# Patient Record
Sex: FEMALE | Race: WHITE | NOT HISPANIC OR LATINO | Employment: FULL TIME | URBAN - METROPOLITAN AREA
[De-identification: names, ages, dates, MRNs, and addresses within clinical notes are randomized per-mention and may not be internally consistent; named-entity substitution may affect disease eponyms.]

---

## 2021-01-02 ENCOUNTER — OFFICE VISIT (OUTPATIENT)
Dept: URGENT CARE | Facility: CLINIC | Age: 21
End: 2021-01-02
Payer: COMMERCIAL

## 2021-01-02 VITALS
OXYGEN SATURATION: 99 % | SYSTOLIC BLOOD PRESSURE: 120 MMHG | RESPIRATION RATE: 18 BRPM | BODY MASS INDEX: 45.16 KG/M2 | TEMPERATURE: 98.2 F | HEART RATE: 85 BPM | HEIGHT: 60 IN | WEIGHT: 230 LBS | DIASTOLIC BLOOD PRESSURE: 66 MMHG

## 2021-01-02 DIAGNOSIS — S51.852A CAT BITE OF LEFT FOREARM WITH INFECTION, INITIAL ENCOUNTER: Primary | ICD-10-CM

## 2021-01-02 DIAGNOSIS — L08.9 CAT BITE OF LEFT FOREARM WITH INFECTION, INITIAL ENCOUNTER: Primary | ICD-10-CM
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
## 2021-01-02 PROCEDURE — 90471 IMMUNIZATION ADMIN: CPT | Performed by: NURSE PRACTITIONER

## 2021-01-02 PROCEDURE — G0382 LEV 3 HOSP TYPE B ED VISIT: HCPCS | Performed by: NURSE PRACTITIONER

## 2021-01-02 PROCEDURE — 90715 TDAP VACCINE 7 YRS/> IM: CPT

## 2021-01-02 RX ORDER — CITALOPRAM 10 MG/1
10 TABLET ORAL DAILY
COMMUNITY
Start: 2020-11-11

## 2021-01-02 RX ORDER — DROSPIRENONE AND ETHINYL ESTRADIOL 0.02-3(28)
1 KIT ORAL DAILY
COMMUNITY

## 2021-01-02 RX ORDER — NITROFURANTOIN 25; 75 MG/1; MG/1
CAPSULE ORAL
COMMUNITY
Start: 2020-10-14

## 2021-01-02 RX ORDER — CEPHALEXIN 500 MG/1
500 CAPSULE ORAL 4 TIMES DAILY
Qty: 40 CAPSULE | Refills: 0 | Status: SHIPPED | OUTPATIENT
Start: 2021-01-02 | End: 2021-01-12

## 2021-01-02 NOTE — PATIENT INSTRUCTIONS
Take the keflex as ordered until completed  Eat yogurt or take a probiotic to restore good bacteria to your gut; this helps prevent stomach irritation/diarrhea while on an antibiotic  The antibiotic takes 24 hours to reach therapeutic level in your system  After that, the redness should not get worse; if it is, you should be seen again     It may take a few days to look significantly better  Animal Bite   AMBULATORY CARE:   Animal bite  injuries range from shallow cuts to deep, life-threatening wounds  Animal bites occur more often on the hands, arms, legs, and face  Bites from dogs and cats are the most common injuries  Common symptoms include the following:   · Cut or punctured skin     · Skin torn from your body    · Swollen or bruised skin, even if the skin is not broken    Seek care immediately if:   · You have a fever  · Your wound is red, swollen, and draining pus  · You see red streaks on the skin around the wound  · You can no longer move the bitten area  · Your heartbeat and breathing are much faster than usual     · You feel dizzy and confused  Contact your healthcare provider if:   · Your pain does not get better, even after you take pain medicine  · You have nightmares or flashbacks about the animal bite  · You have questions or concerns about your condition or care  Treatment for an animal bite  may include any of the following:  · Irrigation and debridement  may be needed to clean out your wound  Dead, damaged, or infected tissue may be cut away to help your wound heal     · Medicines:      ? Antibiotics  prevent or treat a bacterial infection  ? Prescription pain medicine  may be given  Ask how to take this medicine safely  ? A tetanus vaccine  may be needed to prevent tetanus  Tetanus is a life-threatening bacterial infection that affects the nerves and muscles  The bacteria can be spread through animal bites       ? A rabies vaccine  may be needed to prevent rabies  Rabies is a life-threatening viral infection  The virus can be spread through animal bites  · Stitches  may be needed if your wound is large and not infected  · Surgery  may be needed to repair deep injuries or severe wounds  Manage your symptoms:   · Apply antibiotic ointment as directed  This helps prevent infection in minor skin wounds  Antibiotic ointment is available without a prescription  · Keep the wound clean and covered  Wash the wound every day with soap and water or germ-killing cleanser  Ask what kinds of bandages to use  · Apply ice to your wound  Ice helps prevent tissue damage and decreases swelling and pain  Use an ice pack, or put crushed ice in a plastic bag  Cover it with a towel  Apply ice on your wound for 15 to 20 minutes every hour or as directed  · Elevate your wound  Raise your wound above the level of your heart as often as you can  This will help decrease swelling and pain  Prop your wound on pillows or blankets to keep it elevated comfortably  Prevent another animal bite:   · Learn to recognize the signs of a scared pet  Avoid quick, sudden movements  · Do not step between animals that are fighting  · Do not leave a pet alone with a young child  · Do not disturb an animal while it eats, sleeps, or cares for its young  · Do not approach an animal you do not know, especially one that is tied up or caged  · Stay away from animals that seem sick or act strangely  · Do not feed or capture wild animals  Follow up with your healthcare provider as directed:  Write down your questions so you remember to ask them during your visits  © Copyright [address] Information is for End User's use only and may not be sold, redistributed or otherwise used for commercial purposes   All illustrations and images included in CareNotes® are the copyrighted property of [employer]  or [address] [place], [place] [address]emily   The above information is an educational aid only  It is not intended as medical advice for individual conditions or treatments  Talk to your doctor, nurse or pharmacist before following any medical regimen to see if it is safe and effective for you

## 2021-01-02 NOTE — PROGRESS NOTES
[facility]        NAME: Roverto Guerra is a 23 y o  female  : 2000    MRN: [id number]  DATE: 2021  TIME: 12:08 PM    Assessment and Plan   Cat bite of left forearm with infection, initial encounter [S51 852A, L08 9, W55 01XA]  1  Cat bite of left forearm with infection, initial encounter  TDAP Vaccine greater than or equal to 8yo    cephalexin (KEFLEX) 500 mg capsule         Patient Instructions     Patient Instructions   Take the keflex as ordered until completed  Eat yogurt or take a probiotic to restore good bacteria to your gut; this helps prevent stomach irritation/diarrhea while on an antibiotic  The antibiotic takes 24 hours to reach therapeutic level in your system  After that, the redness should not get worse; if it is, you should be seen again     It may take a few days to look significantly better  Animal Bite   AMBULATORY CARE:   Animal bite  injuries range from shallow cuts to deep, life-threatening wounds  Animal bites occur more often on the hands, arms, legs, and face  Bites from dogs and cats are the most common injuries  Common symptoms include the following:   · Cut or punctured skin     · Skin torn from your body    · Swollen or bruised skin, even if the skin is not broken    Seek care immediately if:   · You have a fever  · Your wound is red, swollen, and draining pus  · You see red streaks on the skin around the wound  · You can no longer move the bitten area  · Your heartbeat and breathing are much faster than usual     · You feel dizzy and confused  Contact your healthcare provider if:   · Your pain does not get better, even after you take pain medicine  · You have nightmares or flashbacks about the animal bite  · You have questions or concerns about your condition or care  Treatment for an animal bite  may include any of the following:  · Irrigation and debridement  may be needed to clean out your wound   Dead, damaged, or infected tissue may be cut away to help your wound heal     · Medicines:      ? Antibiotics  prevent or treat a bacterial infection  ? Prescription pain medicine  may be given  Ask how to take this medicine safely  ? A tetanus vaccine  may be needed to prevent tetanus  Tetanus is a life-threatening bacterial infection that affects the nerves and muscles  The bacteria can be spread through animal bites  ? A rabies vaccine  may be needed to prevent rabies  Rabies is a life-threatening viral infection  The virus can be spread through animal bites  · Stitches  may be needed if your wound is large and not infected  · Surgery  may be needed to repair deep injuries or severe wounds  Manage your symptoms:   · Apply antibiotic ointment as directed  This helps prevent infection in minor skin wounds  Antibiotic ointment is available without a prescription  · Keep the wound clean and covered  Wash the wound every day with soap and water or germ-killing cleanser  Ask what kinds of bandages to use  · Apply ice to your wound  Ice helps prevent tissue damage and decreases swelling and pain  Use an ice pack, or put crushed ice in a plastic bag  Cover it with a towel  Apply ice on your wound for 15 to 20 minutes every hour or as directed  · Elevate your wound  Raise your wound above the level of your heart as often as you can  This will help decrease swelling and pain  Prop your wound on pillows or blankets to keep it elevated comfortably  Prevent another animal bite:   · Learn to recognize the signs of a scared pet  Avoid quick, sudden movements  · Do not step between animals that are fighting  · Do not leave a pet alone with a young child  · Do not disturb an animal while it eats, sleeps, or cares for its young  · Do not approach an animal you do not know, especially one that is tied up or caged  · Stay away from animals that seem sick or act strangely      · Do not feed or capture wild animals  Follow up with your healthcare provider as directed:  Write down your questions so you remember to ask them during your visits  © Copyright [address] Information is for End User's use only and may not be sold, redistributed or otherwise used for commercial purposes  All illustrations and images included in CareNotes® are the copyrighted property of A D A M [employer] Inc  or [facility]   The above information is an  only  It is not intended as medical advice for individual conditions or treatments  Talk to your doctor, nurse or pharmacist before following any medical regimen to see if it is safe and effective for you  Follow up with PCP in 3-5 days  Proceed to  ER if symptoms worsen  Chief Complaint     Chief Complaint   Patient presents with   [address] [facility]     left arm x 1 day         History of Present Illness       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  She states that all the animals have had their rabies vaccinations, but she is uncertain the date of this cat's  The sites are all scabbed, but the surrounding skin is now reddened and slightly warm  She does not remember the date of her last tetanus shot  Review of Systems   Review of Systems   Skin: Positive for color change and wound  All other systems reviewed and are negative          Current Medications       Current Outpatient Medications:     citalopram (CeleXA) 10 mg tablet, Take 10 mg by mouth daily, Disp: , Rfl:     drospirenone-ethinyl estradiol (JESSICA) 3-0 02 MG per tablet, Take 1 tablet by mouth daily, Disp: , Rfl:     cephalexin (KEFLEX) 500 mg capsule, Take 1 capsule (500 mg total) by mouth 4 (four) times a day for 10 days, Disp: 40 capsule, Rfl: 0    nitrofurantoin (MACROBID) 100 mg capsule, take 1 capsule by mouth twice a day for 7 days, Disp: , Rfl:     Current Allergies     Allergies as of 01/02/2021    (No Known Allergies)            The following portions of the patient's history were reviewed and updated as appropriate: allergies, current medications, past family history, past medical history, past social history, past surgical history and problem list      Past Medical History:   Diagnosis Date    Anxiety     Depression        History reviewed  No pertinent surgical history  History reviewed  No pertinent family history  Medications have been verified  Objective   /66   Pulse 85   Temp 98 2 °F (36 8 °C)   Resp 18   Ht 5' (1 524 m)   Wt 104 kg (230 lb)   SpO2 99%   BMI 44 92 kg/m²        Physical Exam     Physical Exam  Vitals signs and nursing note reviewed  Constitutional:       General: She is not in acute distress  Appearance: Normal appearance  She is well-developed  She is not ill-appearing, toxic-appearing or diaphoretic  HENT:      Head: Normocephalic and atraumatic  Eyes:      Pupils: Pupils are equal, round, and reactive to light  Neck:      Musculoskeletal: Normal range of motion and neck supple  Pulmonary:      Effort: Pulmonary effort is normal  No respiratory distress  Abdominal:      General: There is no distension  Palpations: Abdomen is soft  Musculoskeletal: Normal range of motion  Skin:     General: Skin is warm and dry  Capillary Refill: Capillary refill takes less than 2 seconds  Findings: Erythema and laceration present  Neurological:      Mental Status: She is alert and oriented to person, place, and time  Psychiatric:         Behavior: Behavior normal          Thought Content:  Thought content normal          Judgment: Judgment normal

## 2022-01-13 ENCOUNTER — OFFICE VISIT (OUTPATIENT)
Dept: PHYSICAL THERAPY | Facility: CLINIC | Age: 22
End: 2022-01-13
Payer: COMMERCIAL

## 2022-01-13 DIAGNOSIS — R20.0 NUMBNESS AND TINGLING SENSATION OF SKIN: Primary | ICD-10-CM

## 2022-01-13 DIAGNOSIS — R20.2 NUMBNESS AND TINGLING SENSATION OF SKIN: Primary | ICD-10-CM

## 2022-01-13 PROCEDURE — 97760 ORTHOTIC MGMT&TRAING 1ST ENC: CPT

## 2022-01-13 NOTE — PROGRESS NOTES
Pt arrived to the clinic with a prescription for a R wrist splint/brace  Pt was fitted for a cock-up brace for her R wrist numbness  Pt demonstrated don/doff of R wrist brace and a good understanding of proper fitment

## 2022-03-31 ENCOUNTER — HOSPITAL ENCOUNTER (OUTPATIENT)
Dept: PERIOP | Facility: HOSPITAL | Age: 22
Setting detail: OUTPATIENT SURGERY
Discharge: HOME | End: 2022-03-31
Attending: INTERNAL MEDICINE
Payer: COMMERCIAL

## 2022-03-31 ENCOUNTER — ANESTHESIA EVENT (OUTPATIENT)
Dept: PERIOP | Facility: HOSPITAL | Age: 22
End: 2022-03-31

## 2022-03-31 ENCOUNTER — ANESTHESIA (OUTPATIENT)
Dept: PERIOP | Facility: HOSPITAL | Age: 22
End: 2022-03-31

## 2022-03-31 VITALS
OXYGEN SATURATION: 99 % | HEART RATE: 77 BPM | RESPIRATION RATE: 16 BRPM | HEIGHT: 60 IN | TEMPERATURE: 98.7 F | BODY MASS INDEX: 46.72 KG/M2 | DIASTOLIC BLOOD PRESSURE: 64 MMHG | SYSTOLIC BLOOD PRESSURE: 113 MMHG | WEIGHT: 238 LBS

## 2022-03-31 DIAGNOSIS — Z80.0 FAMILY HISTORY OF MALIGNANT NEOPLASM OF DIGESTIVE ORGANS: ICD-10-CM

## 2022-03-31 DIAGNOSIS — R15.2 FECAL URGENCY: ICD-10-CM

## 2022-03-31 DIAGNOSIS — R19.7 DIARRHEA, UNSPECIFIED: ICD-10-CM

## 2022-03-31 LAB
EXT PREGNANCY TEST URINE: NEGATIVE
EXT. CONTROL: NORMAL

## 2022-03-31 PROCEDURE — 81025 URINE PREGNANCY TEST: CPT | Performed by: ANESTHESIOLOGY

## 2022-03-31 PROCEDURE — 88305 TISSUE EXAM BY PATHOLOGIST: CPT | Performed by: PATHOLOGY

## 2022-03-31 RX ORDER — LIDOCAINE HYDROCHLORIDE 10 MG/ML
INJECTION, SOLUTION EPIDURAL; INFILTRATION; INTRACAUDAL; PERINEURAL AS NEEDED
Status: DISCONTINUED | OUTPATIENT
Start: 2022-03-31 | End: 2022-03-31

## 2022-03-31 RX ORDER — SODIUM CHLORIDE, SODIUM LACTATE, POTASSIUM CHLORIDE, CALCIUM CHLORIDE 600; 310; 30; 20 MG/100ML; MG/100ML; MG/100ML; MG/100ML
125 INJECTION, SOLUTION INTRAVENOUS CONTINUOUS
Status: CANCELLED | OUTPATIENT
Start: 2022-03-31

## 2022-03-31 RX ORDER — SODIUM CHLORIDE, SODIUM LACTATE, POTASSIUM CHLORIDE, CALCIUM CHLORIDE 600; 310; 30; 20 MG/100ML; MG/100ML; MG/100ML; MG/100ML
125 INJECTION, SOLUTION INTRAVENOUS CONTINUOUS
Status: DISCONTINUED | OUTPATIENT
Start: 2022-03-31 | End: 2022-04-04 | Stop reason: HOSPADM

## 2022-03-31 RX ORDER — PROPOFOL 10 MG/ML
INJECTION, EMULSION INTRAVENOUS AS NEEDED
Status: DISCONTINUED | OUTPATIENT
Start: 2022-03-31 | End: 2022-03-31

## 2022-03-31 RX ADMIN — PROPOFOL 150 MG: 10 INJECTION, EMULSION INTRAVENOUS at 12:31

## 2022-03-31 RX ADMIN — PROPOFOL 50 MG: 10 INJECTION, EMULSION INTRAVENOUS at 12:40

## 2022-03-31 RX ADMIN — LIDOCAINE HYDROCHLORIDE 5 ML: 10 INJECTION, SOLUTION EPIDURAL; INFILTRATION; INTRACAUDAL; PERINEURAL at 12:31

## 2022-03-31 RX ADMIN — SODIUM CHLORIDE, SODIUM LACTATE, POTASSIUM CHLORIDE, AND CALCIUM CHLORIDE 125 ML/HR: .6; .31; .03; .02 INJECTION, SOLUTION INTRAVENOUS at 11:47

## 2022-03-31 RX ADMIN — PROPOFOL 50 MG: 10 INJECTION, EMULSION INTRAVENOUS at 12:39

## 2022-03-31 RX ADMIN — PROPOFOL 50 MG: 10 INJECTION, EMULSION INTRAVENOUS at 12:36

## 2022-03-31 NOTE — ANESTHESIA POSTPROCEDURE EVALUATION
Post-Op Assessment Note    CV Status:  Stable    Pain management: adequate     Mental Status:  Alert and awake   Hydration Status:  Euvolemic   PONV Controlled:  Controlled   Airway Patency:  Patent      Post Op Vitals Reviewed: Yes      Staff: CRNA         No complications documented      BP   90/52   Temp   98 7   Pulse  88   Resp   24   SpO2   97

## 2022-03-31 NOTE — H&P
History and Physical -  [facility]  Delgado Del Angel 24 y o  female MRN: [id number]                  HPI: Delgado Del Angel is a 24y o  year old female who presents for colonoscopy      REVIEW OF SYSTEMS: Per the HPI, and otherwise unremarkable  Historical Information   Past Medical History:   Diagnosis Date    Anxiety     Depression      Past Surgical History:   Procedure Laterality Date    WISDOM TOOTH EXTRACTION  2018     Social History   Social History     Substance and Sexual Activity   Alcohol Use None    Comment: socially     Social History     Substance and Sexual Activity   Drug Use Never     Social History     Tobacco Use   Smoking Status Never Smoker   Smokeless Tobacco Never Used     History reviewed  No pertinent family history  Meds/Allergies       Current Outpatient Medications:     citalopram (CeleXA) 10 mg tablet    drospirenone-ethinyl estradiol (JESSICA) 3-0 02 MG per tablet    nitrofurantoin (MACROBID) 100 mg capsule    Current Facility-Administered Medications:     lactated ringers infusion, 125 mL/hr, Intravenous, Continuous, Continue from Pre-op at 03/31/22 1219    No Known Allergies    Objective     /60   Pulse 84   Temp 99 1 °F (37 3 °C) (Temporal)   Resp 16   Ht 5' (1 524 m)   Wt 108 kg (238 lb)   LMP 01/17/2022 (Approximate)   SpO2 97%   BMI 46 48 kg/m²       PHYSICAL EXAM    Gen: NAD  Head: NCAT  CV: RRR  CHEST: Clear  ABD: soft, NT/ND  EXT: no edema      ASSESSMENT/PLAN:  This is a 24y o  year old female here for colonoscopy, and she is stable and optimized for her procedure

## 2022-03-31 NOTE — ANESTHESIA PREPROCEDURE EVALUATION
Procedure:  COLONOSCOPY    Relevant Problems   No relevant active problems        Physical Exam    Airway    Mallampati score: II  TM Distance: >3 FB  Neck ROM: full     Dental   No notable dental hx     Cardiovascular      Pulmonary      Other Findings        Anesthesia Plan  ASA Score- 2     Anesthesia Type- IV sedation with anesthesia with ASA Monitors  Additional Monitors:   Airway Plan:           Plan Factors-Exercise tolerance (METS): >4 METS  Chart reviewed  Existing labs reviewed  Patient summary reviewed  Patient is not a current smoker  Induction- intravenous  Postoperative Plan-     Informed Consent- Anesthetic plan and risks discussed with patient  I personally reviewed this patient with the CRNA  Discussed and agreed on the Anesthesia Plan with the KAYLA Duarte

## 2022-05-20 ENCOUNTER — HOSPITAL ENCOUNTER (EMERGENCY)
Facility: HOSPITAL | Age: 22
Discharge: HOME | End: 2022-05-20
Attending: INTERNAL MEDICINE | Admitting: INTERNAL MEDICINE
Payer: COMMERCIAL

## 2022-05-20 ENCOUNTER — APPOINTMENT (EMERGENCY)
Dept: NON INVASIVE DIAGNOSTICS | Facility: HOSPITAL | Age: 22
End: 2022-05-20
Payer: COMMERCIAL

## 2022-05-20 VITALS
DIASTOLIC BLOOD PRESSURE: 63 MMHG | TEMPERATURE: 98.5 F | OXYGEN SATURATION: 99 % | HEART RATE: 95 BPM | WEIGHT: 238 LBS | SYSTOLIC BLOOD PRESSURE: 123 MMHG | BODY MASS INDEX: 46.48 KG/M2 | RESPIRATION RATE: 18 BRPM

## 2022-05-20 DIAGNOSIS — M79.661 RIGHT CALF PAIN: Primary | ICD-10-CM

## 2022-05-20 PROCEDURE — 99282 EMERGENCY DEPT VISIT SF MDM: CPT | Performed by: INTERNAL MEDICINE

## 2022-05-20 PROCEDURE — 93971 EXTREMITY STUDY: CPT

## 2022-05-20 PROCEDURE — 99283 EMERGENCY DEPT VISIT LOW MDM: CPT

## 2022-05-20 RX ORDER — NORETHINDRONE AND ETHINYL ESTRADIOL 1 MG-35MCG
KIT ORAL
COMMUNITY
Start: 2022-02-14

## 2022-05-20 NOTE — ED PROVIDER NOTES
History  Chief Complaint   Patient presents with    Leg Pain     Right leg pain and swelling       53-year-old female accompanied by her mother presents with complete chief complaint of right calf swelling  Patient states she awakened this morning had some discomfort in her right calf with some swelling and was concerned she may have o'clock  The patient denies any chest pain chest pressure shortness of breath  The patient states she has no significant pain just some discomfort from the swelling  She does not remember injuring herself  The patient stands at work  Patient is on birth control however she is not taking it  Her mother states she has frequent noncompliance she is being treated for PCO  Prior to Admission Medications   Prescriptions Last Dose Informant Patient Reported? Taking? Nortrel 1/35, 28, 1-35 MG-MCG per tablet   Yes No   citalopram (CeleXA) 10 mg tablet   Yes No   Sig: Take 10 mg by mouth daily   drospirenone-ethinyl estradiol (JESSICA) 3-0 02 MG per tablet Not Taking at Unknown time  Yes No   Sig: Take 1 tablet by mouth daily   Patient not taking: Reported on 5/20/2022   nitrofurantoin (MACROBID) 100 mg capsule Not Taking at Unknown time  Yes No   Sig: take 1 capsule by mouth twice a day for 7 days   Patient not taking: Reported on 5/20/2022      Facility-Administered Medications: None       Past Medical History:   Diagnosis Date    Anxiety     Depression     PCOS (polycystic ovarian syndrome)        Past Surgical History:   Procedure Laterality Date    WISDOM TOOTH EXTRACTION  2018       History reviewed  No pertinent family history  I have reviewed and agree with the history as documented      E-Cigarette/Vaping    E-Cigarette Use Current Some Day User      E-Cigarette/Vaping Substances     Social History     Tobacco Use    Smoking status: Never Smoker    Smokeless tobacco: Never Used   Vaping Use    Vaping Use: Some days   Substance Use Topics    Drug use: Never Review of Systems   Constitutional: Negative  HENT: Negative  Respiratory: Negative  Cardiovascular: Positive for leg swelling  Gastrointestinal: Negative  Musculoskeletal: Negative  Neurological: Negative  Hematological: Negative  Psychiatric/Behavioral: Negative  Physical Exam  Physical Exam  Vitals and nursing note reviewed  Exam conducted with a chaperone present  Constitutional:       General: She is not in acute distress  Appearance: She is obese  She is not ill-appearing  HENT:      Head: Normocephalic and atraumatic  Eyes:      Extraocular Movements: Extraocular movements intact  Pupils: Pupils are equal, round, and reactive to light  Cardiovascular:      Rate and Rhythm: Normal rate and regular rhythm  Pulmonary:      Effort: Pulmonary effort is normal       Breath sounds: Normal breath sounds  Abdominal:      General: Abdomen is flat  Palpations: Abdomen is soft  Musculoskeletal:         General: Swelling present  No tenderness  Normal range of motion  Cervical back: Normal range of motion and neck supple  Comments: Patient's right lower extremity calf region is about 1 cm 1 5 cm larger than the left  There is no cords no erythema nontender negative Homans sign  Skin:     General: Skin is warm and dry  Neurological:      General: No focal deficit present  Mental Status: She is alert and oriented to person, place, and time  Psychiatric:         Mood and Affect: Mood normal          Behavior: Behavior normal          Thought Content:  Thought content normal          Judgment: Judgment normal          Vital Signs  ED Triage Vitals [05/20/22 1834]   Temperature Pulse Respirations Blood Pressure SpO2   98 5 °F (36 9 °C) 95 16 123/63 99 %      Temp Source Heart Rate Source Patient Position - Orthostatic VS BP Location FiO2 (%)   Tympanic Monitor Sitting Left arm --      Pain Score       No Pain           Vitals:    05/20/22 1834 05/20/22 1845   BP: 123/63 123/63   Pulse: 95    Patient Position - Orthostatic VS: Sitting          Visual Acuity      ED Medications  Medications - No data to display    Diagnostic Studies  Results Reviewed     None                 VAS lower limb venous duplex study, unilateral/limited    (Results Pending)              Procedures  Procedures         ED Course                                             MDM  Number of Diagnoses or Management Options  Right calf pain: new and does not require workup  Diagnosis management comments: Patient with right calf pain and some swelling possibly due to calf strain from standing all day at work  Patient states she stands 8 hours a day without given a break  Recommend compression stockings, as well as a good shoe we discussed with good shoes were with a make possible pad in the shoe or cushion      Disposition  Final diagnoses:   Right calf pain     Time reflects when diagnosis was documented in both MDM as applicable and the Disposition within this note     Time User Action Codes Description Comment    5/20/2022  8:04 PM Eli Elizalde Add [S04 998] Right calf pain       ED Disposition     ED Disposition   Discharge    Condition   Stable    Date/Time   Fri May 20, 2022  8:04 PM    Bruce Neri discharge to home/self care  Follow-up Information     Follow up With Specialties Details Why Elva Leung 13, DO Family Medicine In 3 days  [place] [facility]  [phone]            Patient's Medications   Discharge Prescriptions    No medications on file       No discharge procedures on file      PDMP Review     None          ED Provider  Electronically Signed by           Vito Leyva MD  05/20/22 2006

## 2022-05-21 PROCEDURE — 93970 EXTREMITY STUDY: CPT | Performed by: SURGERY

## 2022-07-23 ENCOUNTER — HOSPITAL ENCOUNTER (OUTPATIENT)
Dept: ULTRASOUND IMAGING | Facility: HOSPITAL | Age: 22
Discharge: HOME | End: 2022-07-23
Attending: UROLOGY
Payer: COMMERCIAL

## 2022-07-23 DIAGNOSIS — N39.0 URINARY TRACT INFECTION, SITE NOT SPECIFIED: ICD-10-CM

## 2022-07-23 PROCEDURE — 76770 US EXAM ABDO BACK WALL COMP: CPT

## 2022-11-14 ENCOUNTER — OFFICE VISIT (OUTPATIENT)
Dept: URGENT CARE | Facility: CLINIC | Age: 22
End: 2022-11-14

## 2022-11-14 VITALS
HEIGHT: 60 IN | RESPIRATION RATE: 18 BRPM | BODY MASS INDEX: 46.92 KG/M2 | SYSTOLIC BLOOD PRESSURE: 126 MMHG | WEIGHT: 239 LBS | OXYGEN SATURATION: 98 % | DIASTOLIC BLOOD PRESSURE: 72 MMHG | TEMPERATURE: 97.8 F | HEART RATE: 98 BPM

## 2022-11-14 DIAGNOSIS — R05.1 ACUTE COUGH: ICD-10-CM

## 2022-11-14 DIAGNOSIS — J06.9 VIRAL UPPER RESPIRATORY INFECTION: Primary | ICD-10-CM

## 2022-11-14 RX ORDER — BENZONATATE 100 MG/1
100 CAPSULE ORAL 3 TIMES DAILY PRN
Qty: 20 CAPSULE | Refills: 0 | Status: SHIPPED | OUTPATIENT
Start: 2022-11-14

## 2022-11-14 RX ORDER — ALBUTEROL SULFATE 90 UG/1
2 AEROSOL, METERED RESPIRATORY (INHALATION) EVERY 6 HOURS PRN
Qty: 8.5 G | Refills: 0 | Status: SHIPPED | OUTPATIENT
Start: 2022-11-14

## 2022-11-14 RX ORDER — FLUTICASONE PROPIONATE 50 MCG
2 SPRAY, SUSPENSION (ML) NASAL DAILY
Qty: 18.2 ML | Refills: 0 | Status: SHIPPED | OUTPATIENT
Start: 2022-11-14

## 2022-11-14 NOTE — PROGRESS NOTES
[facility]        NAME: Mamadou Vera is a 20 y o  female  : 2000    MRN: [id number]  DATE: 2022  TIME: 7:05 PM    Assessment and Plan   Viral upper respiratory infection [J06 9]  1  Viral upper respiratory infection  albuterol (ProAir HFA) 90 mcg/act inhaler    fluticasone (FLONASE) 50 mcg/act nasal spray    benzonatate (TESSALON PERLES) 100 mg capsule   2  Acute cough  albuterol (ProAir HFA) 90 mcg/act inhaler    fluticasone (FLONASE) 50 mcg/act nasal spray    benzonatate (TESSALON PERLES) 100 mg capsule         Patient Instructions     Albuterol as prescribed  Take tessalon perles as needed for cough  Start flonase as prescribed  Vitamin D3 2000 IU daily  Vitamin C 1000mg twice per day  Multivitamin daily  Fluids and rest  Over the counter cold medication as needed (EX: Coricidin HBP, tylenol/motrin)  Follow up with PCP in 3-5 days  Proceed to  ER if symptoms worsen  Chief Complaint     Chief Complaint   Patient presents with   • Sinusitis     Stuffy nose, post nasal drip, sinus pressure  Chest congestion  Started Thursday  History of Present Illness       Patient is a 79-year-old female with no significant PMH presenting in the clinic today for cold symptoms x 4 days  Patient mentions nasal congestion which has been worsening over the past 2 days  Admits dry cough, b/l ear fullness, sore throat, chest tightness, bodyaches, and headache  Denies fever, chills, chest pain, abdominal, n/v/d, and dizziness  Patient mentions the use of Mucinex, DayQuil, NyQuil, and saline nasal spray with moderate symptom relief  Positive sick contacts at work  Review of Systems   Review of Systems   Constitutional: Negative for chills, fatigue and fever  HENT: Positive for ear pain, postnasal drip, rhinorrhea and sore throat  Negative for congestion, sinus pressure and sinus pain  Respiratory: Positive for cough  Negative for shortness of breath      Cardiovascular: Negative for chest pain  Gastrointestinal: Negative for abdominal pain, diarrhea, nausea and vomiting  Musculoskeletal: Negative for myalgias  Skin: Negative for rash  Neurological: Positive for headaches  Negative for dizziness  Current Medications       Current Outpatient Medications:   •  albuterol (ProAir HFA) 90 mcg/act inhaler, Inhale 2 puffs every 6 (six) hours as needed for wheezing or shortness of breath, Disp: 8 5 g, Rfl: 0  •  benzonatate (TESSALON PERLES) 100 mg capsule, Take 1 capsule (100 mg total) by mouth 3 (three) times a day as needed for cough, Disp: 20 capsule, Rfl: 0  •  citalopram (CeleXA) 10 mg tablet, Take 10 mg by mouth daily, Disp: , Rfl:   •  fluticasone (FLONASE) 50 mcg/act nasal spray, 2 sprays into each nostril daily, Disp: 18 2 mL, Rfl: 0  •  Nortrel 1/35, 28, 1-35 MG-MCG per tablet, , Disp: , Rfl:   •  drospirenone-ethinyl estradiol (JESSICA) 3-0 02 MG per tablet, Take 1 tablet by mouth daily (Patient not taking: Reported on 11/14/2022), Disp: , Rfl:   •  nitrofurantoin (MACROBID) 100 mg capsule, take 1 capsule by mouth twice a day for 7 days (Patient not taking: No sig reported), Disp: , Rfl:     Current Allergies     Allergies as of 11/14/2022   • (No Known Allergies)            The following portions of the patient's history were reviewed and updated as appropriate: allergies, current medications, past family history, past medical history, past social history, past surgical history and problem list      Past Medical History:   Diagnosis Date   • Anxiety    • Depression    • PCOS (polycystic ovarian syndrome)        Past Surgical History:   Procedure Laterality Date   • WISDOM TOOTH EXTRACTION  2018       No family history on file  Medications have been verified  Objective   /72   Pulse 98   Temp 97 8 °F (36 6 °C)   Resp 18   Ht 5' (1 524 m)   Wt 108 kg (239 lb)   SpO2 98%   BMI 46 68 kg/m²        Physical Exam     Physical Exam  Vitals reviewed  Constitutional:       General: She is not in acute distress  Appearance: Normal appearance  She is obese  She is not ill-appearing  HENT:      Head: Normocephalic and atraumatic  Right Ear: Tympanic membrane, ear canal and external ear normal  There is no impacted cerumen  Left Ear: Tympanic membrane, ear canal and external ear normal  There is no impacted cerumen  Nose: Congestion present  No rhinorrhea  Right Sinus: No maxillary sinus tenderness or frontal sinus tenderness  Left Sinus: No maxillary sinus tenderness or frontal sinus tenderness  Mouth/Throat:      Mouth: Mucous membranes are moist       Pharynx: Posterior oropharyngeal erythema present  No oropharyngeal exudate  Eyes:      General:         Right eye: No discharge  Left eye: No discharge  Conjunctiva/sclera: Conjunctivae normal    Cardiovascular:      Rate and Rhythm: Normal rate and regular rhythm  Pulses: Normal pulses  Heart sounds: Normal heart sounds  No murmur heard  No friction rub  Pulmonary:      Effort: Pulmonary effort is normal       Breath sounds: Normal breath sounds  No wheezing, rhonchi or rales  Abdominal:      General: Abdomen is flat  Bowel sounds are normal  There is no distension  Palpations: Abdomen is soft  Tenderness: There is no abdominal tenderness  Musculoskeletal:      Cervical back: Normal range of motion and neck supple  No tenderness  Lymphadenopathy:      Cervical: No cervical adenopathy  Skin:     General: Skin is warm  Capillary Refill: Capillary refill takes less than 2 seconds  Neurological:      General: No focal deficit present  Mental Status: She is alert     Psychiatric:         Mood and Affect: Mood normal

## 2022-11-14 NOTE — PATIENT INSTRUCTIONS
Albuterol as prescribed  Take tessalon perles as needed for cough  Start flonase as prescribed  Vitamin D3 2000 IU daily  Vitamin C 1000mg twice per day  Multivitamin daily  Fluids and rest  Over the counter cold medication as needed (EX: Coricidin HBP, tylenol/motrin)  Follow up with PCP in 3-5 days  Proceed to ER if symptoms worsen

## 2022-12-26 ENCOUNTER — OFFICE VISIT (OUTPATIENT)
Dept: URGENT CARE | Facility: CLINIC | Age: 22
End: 2022-12-26

## 2022-12-26 DIAGNOSIS — Z20.828 EXPOSURE TO THE FLU: ICD-10-CM

## 2022-12-26 DIAGNOSIS — R05.1 ACUTE COUGH: ICD-10-CM

## 2022-12-26 DIAGNOSIS — R68.89 FLU-LIKE SYMPTOMS: Primary | ICD-10-CM

## 2022-12-26 NOTE — LETTER
December 26, 2022     Patient: Alexandria Childs   YOB: 2000   Date of Visit: 12/26/2022       To Whom It May Concern: It is my medical opinion that Anastasiia Davis may return to work on 12/28/2022  If you have any questions or concerns, please don't hesitate to call           Sincerely,        SUHALI Padilla    CC: No Recipients

## 2022-12-26 NOTE — PATIENT INSTRUCTIONS
You have flu like symptoms  You are to rest  Drink gatorade or pedialyte for rehydration  You are to eat a BRAT diet - bananas, rice, applesauce and toast   You may try imodium for diarrhea  Take tylenol or motrin for fever or pain  You are to download SL mychart for the results in 24-48 hours  You will be notified if the results are positive  You are to mask x 10 days and mask if still symptomatic after the 10 days  Follow up with your PCP in 2-3 days  Go to the ED if symptoms worsen  Take claritin if runny nose and ear fullness  Continue with mucinex if needed for cough and congestion  You appear to have covid/symptoms  You have a covid test pending  You are to download SL mychart for the results in 24-48 hours  You will be notified if results are +  You are to take vitamin C, D, robitussin for cough  Do not take cough suppressants; you want to take an expectorant  Sleep on your stomach  You are to quarantine as required per CDC guidelines  Mask x 10 days if positive  Mask as long as you have symptoms  See your PCP for follow up in 2-3 days  Go to the ED if symptoms worsen or are severe     You are to call your PCP if your results are + to discuss Paxlovid treatment

## 2022-12-26 NOTE — PROGRESS NOTES
[facility]        NAME: Maliha Fuller is a 20 y o  female  : 2000    MRN: [id number]  DATE: 2022  TIME: 10:19 AM    Assessment and Plan   Flu-like symptoms [R68 89]  1  Flu-like symptoms        2  Acute cough  Cov/Flu-Col[facility] at [facility]      3  Exposure to the flu              Patient Instructions       Follow up with PCP in 3-5 days  Proceed to  ER if symptoms worsen  You have flu like symptoms  You are to rest  Drink gatorade or pedialyte for rehydration  You are to eat a BRAT diet - bananas, rice, applesauce and toast   You may try imodium for diarrhea  Take tylenol or motrin for fever or pain  You are to download SL mychart for the results in 24-48 hours  You will be notified if the results are positive  You are to mask x 10 days and mask if still symptomatic after the 10 days  Follow up with your PCP in 2-3 days  Go to the ED if symptoms worsen  Take claritin if runny nose and ear fullness  Continue with mucinex if needed for cough and congestion  You appear to have covid/symptoms  You have a covid test pending  You are to download SL mychart for the results in 24-48 hours  You will be notified if results are +  You are to take vitamin C, D, robitussin for cough  Do not take cough suppressants; you want to take an expectorant  Sleep on your stomach  You are to quarantine as required per CDC guidelines  Mask x 10 days if positive  Mask as long as you have symptoms  See your PCP for follow up in 2-3 days  Go to the ED if symptoms worsen or are severe  You are to call your PCP if your results are + to discuss Paxlovid treatment        Chief Complaint     Chief Complaint   Patient presents with   • Cough     3 days    • Fever   • Generalized Body Aches     X 3 days          History of Present Illness       This is a 20year old female who states is covid vaccinated but not flu and many people at her work have flu  She states Friday she started with cough then over the weekend developed temp of 99, bodyaches, chills, vomiting  Denies diarrhea  She denies lung disease, tobacco use or pregnancy  She was taking mucinex  Pt states "I think I have the flu"  Review of Systems   Review of Systems   Constitutional: Positive for appetite change, chills, fatigue and fever  HENT: Positive for congestion  Eyes: Negative  Respiratory: Positive for cough  Cardiovascular: Negative  Gastrointestinal: Positive for vomiting  Endocrine: Negative  Genitourinary: Negative  Musculoskeletal: Positive for myalgias  Skin: Negative  Allergic/Immunologic: Negative  Neurological: Negative  Hematological: Negative  Psychiatric/Behavioral: Negative            Current Medications       Current Outpatient Medications:   •  citalopram (CeleXA) 10 mg tablet, Take 10 mg by mouth daily, Disp: , Rfl:   •  Nortrel 1/35, 28, 1-35 MG-MCG per tablet, , Disp: , Rfl:   •  albuterol (ProAir HFA) 90 mcg/act inhaler, Inhale 2 puffs every 6 (six) hours as needed for wheezing or shortness of breath (Patient not taking: Reported on 12/26/2022), Disp: 8 5 g, Rfl: 0  •  benzonatate (TESSALON PERLES) 100 mg capsule, Take 1 capsule (100 mg total) by mouth 3 (three) times a day as needed for cough (Patient not taking: Reported on 12/26/2022), Disp: 20 capsule, Rfl: 0  •  drospirenone-ethinyl estradiol (JESSICA) 3-0 02 MG per tablet, Take 1 tablet by mouth daily (Patient not taking: Reported on 11/14/2022), Disp: , Rfl:   •  fluticasone (FLONASE) 50 mcg/act nasal spray, 2 sprays into each nostril daily (Patient not taking: Reported on 12/26/2022), Disp: 18 2 mL, Rfl: 0  •  nitrofurantoin (MACROBID) 100 mg capsule, take 1 capsule by mouth twice a day for 7 days (Patient not taking: No sig reported), Disp: , Rfl:     Current Allergies     Allergies as of 12/26/2022   • (No Known Allergies)            The following portions of the patient's history were reviewed and updated as appropriate: allergies, current medications, past family history, past medical history, past social history, past surgical history and problem list      Past Medical History:   Diagnosis Date   • Anxiety    • Depression    • PCOS (polycystic ovarian syndrome)        Past Surgical History:   Procedure Laterality Date   • WISDOM TOOTH EXTRACTION  2018       History reviewed  No pertinent family history  Medications have been verified  Objective   There were no vitals taken for this visit  No LMP recorded  Physical Exam     Physical Exam  Vitals and nursing note reviewed  Constitutional:       General: She is not in acute distress  Appearance: Normal appearance  She is obese  She is not ill-appearing, toxic-appearing or diaphoretic  HENT:      Head: Normocephalic and atraumatic  Ears:      Comments: Mild TM fluid b/l      Nose: Congestion present  No rhinorrhea  Mouth/Throat:      Mouth: Mucous membranes are moist       Pharynx: Oropharynx is clear  No oropharyngeal exudate or posterior oropharyngeal erythema  Eyes:      Extraocular Movements: Extraocular movements intact  Cardiovascular:      Rate and Rhythm: Normal rate and regular rhythm  Pulses: Normal pulses  Heart sounds: Normal heart sounds  Pulmonary:      Effort: Pulmonary effort is normal       Breath sounds: Normal breath sounds  Musculoskeletal:         General: Normal range of motion  Cervical back: Normal range of motion and neck supple  Skin:     General: Skin is warm and dry  Capillary Refill: Capillary refill takes less than 2 seconds  Neurological:      General: No focal deficit present  Mental Status: She is alert and oriented to person, place, and time  Psychiatric:         Mood and Affect: Mood normal          Behavior: Behavior normal          Thought Content:  Thought content normal          Judgment: Judgment normal

## 2022-12-27 ENCOUNTER — TELEPHONE (OUTPATIENT)
Dept: URGENT CARE | Facility: CLINIC | Age: 22
End: 2022-12-27

## 2022-12-27 LAB
FLUAV RNA RESP QL NAA+PROBE: POSITIVE
FLUBV RNA RESP QL NAA+PROBE: NEGATIVE
SARS-COV-2 RNA RESP QL NAA+PROBE: NEGATIVE

## 2022-12-27 NOTE — LETTER
December 27, 2022      Blake Landeros DO  [address] [facility]  [address]    Patient: Kwaku Heaton   YOB: 2000   Date of Visit: 12/27/2022        Dear Blake Landeros DO:    Your patient, Jose Roberto Harmon was seen in our urgent care department on 12/26/2022  She is positive influenza A  She may return to work 1/2/2023 as long as fever free x 24 hours with use of medication  If you have any questions or concerns, please don't hesitate to call             Sincerely,      SUHAIL Sena

## 2022-12-27 NOTE — TELEPHONE ENCOUNTER
Called pt and spoke with her about + flu A results  She states she called off for tomorrow as she was to return to work tomorrow  Will give pt extended sick time note  Pt states she will   Discussed viral illness and symptomatic relief

## 2023-05-06 ENCOUNTER — OFFICE VISIT (OUTPATIENT)
Dept: URGENT CARE | Facility: CLINIC | Age: 23
End: 2023-05-06

## 2023-05-06 VITALS
HEIGHT: 60 IN | SYSTOLIC BLOOD PRESSURE: 135 MMHG | DIASTOLIC BLOOD PRESSURE: 76 MMHG | RESPIRATION RATE: 20 BRPM | HEART RATE: 68 BPM | WEIGHT: 244.2 LBS | TEMPERATURE: 98.1 F | BODY MASS INDEX: 47.94 KG/M2 | OXYGEN SATURATION: 97 %

## 2023-05-06 DIAGNOSIS — N30.01 ACUTE CYSTITIS WITH HEMATURIA: Primary | ICD-10-CM

## 2023-05-06 LAB
SL AMB  POCT GLUCOSE, UA: ABNORMAL
SL AMB LEUKOCYTE ESTERASE,UA: ABNORMAL
SL AMB POCT BILIRUBIN,UA: ABNORMAL
SL AMB POCT BLOOD,UA: ABNORMAL
SL AMB POCT CLARITY,UA: ABNORMAL
SL AMB POCT COLOR,UA: YELLOW
SL AMB POCT KETONES,UA: 15
SL AMB POCT NITRITE,UA: ABNORMAL
SL AMB POCT PH,UA: 6
SL AMB POCT SPECIFIC GRAVITY,UA: 1.02
SL AMB POCT URINE HCG: NORMAL
SL AMB POCT URINE PROTEIN: ABNORMAL
SL AMB POCT UROBILINOGEN: 0.2

## 2023-05-06 RX ORDER — MEDROXYPROGESTERONE ACETATE 150 MG/ML
INJECTION, SUSPENSION INTRAMUSCULAR
COMMUNITY
Start: 2023-02-21

## 2023-05-06 RX ORDER — CEPHALEXIN 500 MG/1
500 CAPSULE ORAL EVERY 12 HOURS SCHEDULED
Qty: 14 CAPSULE | Refills: 0 | Status: SHIPPED | OUTPATIENT
Start: 2023-05-06 | End: 2023-05-13

## 2023-05-06 RX ORDER — PHENAZOPYRIDINE HYDROCHLORIDE 200 MG/1
200 TABLET, FILM COATED ORAL
Qty: 10 TABLET | Refills: 0 | Status: SHIPPED | OUTPATIENT
Start: 2023-05-06

## 2023-05-06 RX ORDER — CITALOPRAM 20 MG/1
20 TABLET ORAL DAILY
COMMUNITY
Start: 2023-03-20

## 2023-05-06 NOTE — PROGRESS NOTES
[facility]        NAME: Jayden Morgan is a 24 y o  female  : 2000    MRN: [id number]  DATE: May 6, 2023  TIME: 7:22 PM    Assessment and Plan   Acute cystitis with hematuria [N30 01]  1  Acute cystitis with hematuria  Urine culture    POCT urine dip    POCT urine HCG    cephalexin (KEFLEX) 500 mg capsule    phenazopyridine (PYRIDIUM) 200 mg tablet            Patient Instructions       Follow up with PCP in 3-5 days  Proceed to  ER if symptoms worsen  Chief Complaint     Chief Complaint   Patient presents with    Possible UTI     Dull aching in bladder and irritation for almost 1 week          History of Present Illness       Patient is a 22 y/o/f presenting to [facility] with urinary discomfort and hematuria  Pt reports urinary symptoms began about 1 week ago  Hematuria began 3 days ago  Pt did notice a clot on one occasion  Pt has a hx of hemorrhagic cystitis in the past   Pt denies any fever, chills, N/V or flank pain  Lower abdominal pressure  Urinary Tract Infection   This is a new problem  The current episode started in the past 7 days  The problem occurs intermittently  The problem has been gradually worsening  There has been no fever  Associated symptoms include frequency and hematuria  Pertinent negatives include no chills or vomiting  Review of Systems   Review of Systems   Constitutional: Negative for chills and fever  HENT: Negative for ear pain and sore throat  Eyes: Negative for pain and visual disturbance  Respiratory: Negative for cough and shortness of breath  Cardiovascular: Negative for chest pain and palpitations  Gastrointestinal: Negative for abdominal pain and vomiting  Genitourinary: Positive for dysuria, frequency and hematuria  Musculoskeletal: Negative for arthralgias and back pain  Skin: Negative for color change and rash  Neurological: Negative for seizures and syncope  All other systems reviewed and are negative          Current Medications       Current Outpatient Medications:     cephalexin (KEFLEX) 500 mg capsule, Take 1 capsule (500 mg total) by mouth every 12 (twelve) hours for 7 days, Disp: 14 capsule, Rfl: 0    Cholecalciferol 50 MCG (2000 UT) CAPS, Take 1 capsule by mouth, Disp: , Rfl:     citalopram (CeleXA) 10 mg tablet, Take 10 mg by mouth daily, Disp: , Rfl:     medroxyPROGESTERone (DEPO-PROVERA) 150 mg/mL injection, , Disp: , Rfl:     phenazopyridine (PYRIDIUM) 200 mg tablet, Take 1 tablet (200 mg total) by mouth 3 (three) times a day with meals, Disp: 10 tablet, Rfl: 0    albuterol (ProAir HFA) 90 mcg/act inhaler, Inhale 2 puffs every 6 (six) hours as needed for wheezing or shortness of breath (Patient not taking: Reported on 12/26/2022), Disp: 8 5 g, Rfl: 0    benzonatate (TESSALON PERLES) 100 mg capsule, Take 1 capsule (100 mg total) by mouth 3 (three) times a day as needed for cough (Patient not taking: Reported on 12/26/2022), Disp: 20 capsule, Rfl: 0    citalopram (CeleXA) 20 mg tablet, Take 20 mg by mouth daily (Patient not taking: Reported on 5/6/2023), Disp: , Rfl:     drospirenone-ethinyl estradiol (JESSICA) 3-0 02 MG per tablet, Take 1 tablet by mouth daily (Patient not taking: Reported on 11/14/2022), Disp: , Rfl:     fluticasone (FLONASE) 50 mcg/act nasal spray, 2 sprays into each nostril daily (Patient not taking: Reported on 12/26/2022), Disp: 18 2 mL, Rfl: 0    nitrofurantoin (MACROBID) 100 mg capsule, take 1 capsule by mouth twice a day for 7 days (Patient not taking: Reported on 5/20/2022), Disp: , Rfl:     Nortrel 1/35, 28, 1-35 MG-MCG per tablet, , Disp: , Rfl:     Current Allergies     Allergies as of 05/06/2023    (No Known Allergies)            The following portions of the patient's history were reviewed and updated as appropriate: allergies, current medications, past family history, past medical history, past social history, past surgical history and problem list      Past Medical History: Diagnosis Date    Anxiety     Depression     PCOS (polycystic ovarian syndrome)        Past Surgical History:   Procedure Laterality Date    WISDOM TOOTH EXTRACTION  2018       History reviewed  No pertinent family history  Medications have been verified  Objective   /76   Pulse 68   Temp 98 1 °F (36 7 °C)   Resp 20   Ht 5' (1 524 m)   Wt 111 kg (244 lb 3 2 oz)   SpO2 97%   BMI 47 69 kg/m²   No LMP recorded  Physical Exam     Physical Exam  Constitutional:       Appearance: Normal appearance  HENT:      Head: Normocephalic and atraumatic  Nose: Nose normal       Mouth/Throat:      Mouth: Mucous membranes are moist    Eyes:      Extraocular Movements: Extraocular movements intact  Conjunctiva/sclera: Conjunctivae normal       Pupils: Pupils are equal, round, and reactive to light  Cardiovascular:      Rate and Rhythm: Normal rate  Pulmonary:      Effort: Pulmonary effort is normal    Musculoskeletal:         General: Normal range of motion  Cervical back: Normal range of motion and neck supple  Skin:     General: Skin is warm and dry  Capillary Refill: Capillary refill takes less than 2 seconds  Neurological:      General: No focal deficit present  Mental Status: She is alert and oriented to person, place, and time     Psychiatric:         Mood and Affect: Mood normal          Behavior: Behavior normal

## 2023-05-08 LAB — BACTERIA UR CULT: NORMAL

## 2024-07-16 ENCOUNTER — HOSPITAL ENCOUNTER (OUTPATIENT)
Dept: PERIOP | Facility: HOSPITAL | Age: 24
Setting detail: OUTPATIENT SURGERY
Discharge: HOME | End: 2024-07-16
Attending: INTERNAL MEDICINE | Admitting: INTERNAL MEDICINE
Payer: COMMERCIAL

## 2024-07-16 ENCOUNTER — ANESTHESIA (OUTPATIENT)
Dept: PERIOP | Facility: HOSPITAL | Age: 24
End: 2024-07-16

## 2024-07-16 ENCOUNTER — ANESTHESIA EVENT (OUTPATIENT)
Dept: PERIOP | Facility: HOSPITAL | Age: 24
End: 2024-07-16

## 2024-07-16 VITALS
HEART RATE: 62 BPM | SYSTOLIC BLOOD PRESSURE: 112 MMHG | RESPIRATION RATE: 19 BRPM | HEIGHT: 61 IN | OXYGEN SATURATION: 96 % | DIASTOLIC BLOOD PRESSURE: 62 MMHG | WEIGHT: 248 LBS | TEMPERATURE: 97.5 F | BODY MASS INDEX: 46.82 KG/M2

## 2024-07-16 DIAGNOSIS — R12 HEARTBURN: ICD-10-CM

## 2024-07-16 DIAGNOSIS — R11.0 NAUSEA: ICD-10-CM

## 2024-07-16 DIAGNOSIS — R09.A2 FOREIGN BODY SENSATION, THROAT: ICD-10-CM

## 2024-07-16 LAB
EXT PREGNANCY TEST URINE: NEGATIVE
EXT. CONTROL: NORMAL

## 2024-07-16 PROCEDURE — 88305 TISSUE EXAM BY PATHOLOGIST: CPT | Performed by: PATHOLOGY

## 2024-07-16 PROCEDURE — 81025 URINE PREGNANCY TEST: CPT | Performed by: STUDENT IN AN ORGANIZED HEALTH CARE EDUCATION/TRAINING PROGRAM

## 2024-07-16 RX ORDER — LIDOCAINE HYDROCHLORIDE 20 MG/ML
INJECTION, SOLUTION EPIDURAL; INFILTRATION; INTRACAUDAL; PERINEURAL AS NEEDED
Status: DISCONTINUED | OUTPATIENT
Start: 2024-07-16 | End: 2024-07-16

## 2024-07-16 RX ORDER — SODIUM CHLORIDE, SODIUM LACTATE, POTASSIUM CHLORIDE, CALCIUM CHLORIDE 600; 310; 30; 20 MG/100ML; MG/100ML; MG/100ML; MG/100ML
INJECTION, SOLUTION INTRAVENOUS CONTINUOUS PRN
Status: DISCONTINUED | OUTPATIENT
Start: 2024-07-16 | End: 2024-07-16

## 2024-07-16 RX ORDER — GABAPENTIN 300 MG/1
300 CAPSULE ORAL 3 TIMES DAILY
COMMUNITY

## 2024-07-16 RX ORDER — PROPOFOL 10 MG/ML
INJECTION, EMULSION INTRAVENOUS AS NEEDED
Status: DISCONTINUED | OUTPATIENT
Start: 2024-07-16 | End: 2024-07-16

## 2024-07-16 RX ORDER — DICLOFENAC SODIUM 75 MG/1
75 TABLET, DELAYED RELEASE ORAL 2 TIMES DAILY
COMMUNITY

## 2024-07-16 RX ADMIN — PROPOFOL 100 MG: 10 INJECTION, EMULSION INTRAVENOUS at 10:20

## 2024-07-16 RX ADMIN — SODIUM CHLORIDE, SODIUM LACTATE, POTASSIUM CHLORIDE, AND CALCIUM CHLORIDE: .6; .31; .03; .02 INJECTION, SOLUTION INTRAVENOUS at 10:07

## 2024-07-16 RX ADMIN — LIDOCAINE HYDROCHLORIDE 100 MG: 20 INJECTION, SOLUTION EPIDURAL; INFILTRATION; INTRACAUDAL; PERINEURAL at 10:17

## 2024-07-16 RX ADMIN — PROPOFOL 150 MG: 10 INJECTION, EMULSION INTRAVENOUS at 10:17

## 2024-07-16 RX ADMIN — PROPOFOL 50 MG: 10 INJECTION, EMULSION INTRAVENOUS at 10:25

## 2024-07-16 NOTE — ANESTHESIA PREPROCEDURE EVALUATION
Procedure:  EGD    Relevant Problems   No relevant active problems        Physical Exam    Airway    Mallampati score: III  TM Distance: >3 FB  Neck ROM: full     Dental       Cardiovascular  Cardiovascular exam normal    Pulmonary  Pulmonary exam normal     Other Findings  post-pubertal.      Anesthesia Plan  ASA Score- 1     Anesthesia Type- IV sedation with anesthesia with ASA Monitors.         Additional Monitors:     Airway Plan:            Plan Factors-Exercise tolerance (METS): >4 METS.    Chart reviewed. EKG reviewed.  Existing labs reviewed. Patient summary reviewed.    Patient is not a current smoker.  Patient did not smoke on day of surgery.    Obstructive sleep apnea risk education given perioperatively.        Induction- intravenous.    Postoperative Plan-     Perioperative Resuscitation Plan - Level 1 - Full Code.       Informed Consent- Anesthetic plan and risks discussed with patient and spouse.  I personally reviewed this patient with the CRNA. Discussed and agreed on the Anesthesia Plan with the CRNA..

## 2024-07-16 NOTE — INTERVAL H&P NOTE
H&P reviewed. After examining the patient I find no changes in the patients condition since the H&P had been written.    Vitals:    07/16/24 0902   BP: 129/67   Pulse: 93   Resp: 20   Temp: 97.5 °F (36.4 °C)   SpO2: 97%

## 2024-07-19 PROCEDURE — 88305 TISSUE EXAM BY PATHOLOGIST: CPT | Performed by: PATHOLOGY

## 2024-07-31 ENCOUNTER — OFFICE VISIT (OUTPATIENT)
Dept: URGENT CARE | Facility: CLINIC | Age: 24
End: 2024-07-31
Payer: COMMERCIAL

## 2024-07-31 VITALS
TEMPERATURE: 98.7 F | OXYGEN SATURATION: 95 % | RESPIRATION RATE: 18 BRPM | HEART RATE: 87 BPM | SYSTOLIC BLOOD PRESSURE: 150 MMHG | DIASTOLIC BLOOD PRESSURE: 65 MMHG

## 2024-07-31 DIAGNOSIS — L55.9 SUNBURN: Primary | ICD-10-CM

## 2024-07-31 PROCEDURE — 99213 OFFICE O/P EST LOW 20 MIN: CPT | Performed by: PHYSICIAN ASSISTANT

## 2024-07-31 NOTE — LETTER
July 31, 2024     Patient: Mariama Bonilla   YOB: 2000   Date of Visit: 7/31/2024       To Whom It May Concern:    It is my medical opinion that Mariama Bonilla may return to work on 08/03/2024 .    If you have any questions or concerns, please don't hesitate to call.         Sincerely,        Jah Alston PA-C    CC: No Recipients

## 2024-07-31 NOTE — PROGRESS NOTES
[facility]        NAME: Mariama Bonilla is a 24 y.o. female  : 2000    MRN: [id number]  DATE: 2024  TIME: 4:33 PM    Assessment and Plan   Sunburn [L55.9]  1. Sunburn              Patient Instructions     Patient Instructions   Discussed continuing to apply after sun moisturizing agents to areas of burn.  Continue to monitor for any signs of infection.  With any progression or worsening of symptoms return or be seen in ER.  Work note provided.  All patient's questions answered and is agreeable with this plan.      Follow up with PCP in 3-5 days.  Proceed to  ER if symptoms worsen.    Chief Complaint     Chief Complaint   Patient presents with    Sunburn     Chest and arms, Saturday          History of Present Illness       Patient is a 24-year-old female presenting today with sunburn x 5 days.  Patient notes 5 days ago she was outside in the sun for an extended period of time, forgot to put on sunscreen, sustained a significant burn of her shoulders and chest, notes that a couple days after burn developed she developed some blisters of her shoulders and chest, states a couple of the areas have opened and wants to make sure that everything is okay.  Has been applying some after sun ointments and antibiotic ointment which seems to provide some relief.  Denies fever, discharge or drainage, lightheadedness, dizziness, headaches.        Review of Systems   Review of Systems   Constitutional:  Negative for chills and fever.   HENT:  Negative for ear pain and sore throat.    Eyes:  Negative for pain and visual disturbance.   Respiratory:  Negative for cough and shortness of breath.    Cardiovascular:  Negative for chest pain and palpitations.   Gastrointestinal:  Negative for abdominal pain and vomiting.   Genitourinary:  Negative for dysuria and hematuria.   Musculoskeletal:  Negative for arthralgias and back pain.   Skin:  Positive for rash.   Neurological:  Negative for seizures and syncope.    All other systems reviewed and are negative.        Current Medications       Current Outpatient Medications:     Cholecalciferol 50 MCG (2000 UT) CAPS, Take 1 capsule by mouth, Disp: , Rfl:     citalopram (CeleXA) 20 mg tablet, Take 20 mg by mouth daily, Disp: , Rfl:     diclofenac (VOLTAREN) 75 mg EC tablet, Take 75 mg by mouth 2 (two) times a day, Disp: , Rfl:     gabapentin (NEURONTIN) 300 mg capsule, Take 300 mg by mouth 3 (three) times a day, Disp: , Rfl:     metFORMIN (GLUCOPHAGE) 500 mg tablet, Take 500 mg by mouth 2 (two) times a day with meals, Disp: , Rfl:     albuterol (ProAir HFA) 90 mcg/act inhaler, Inhale 2 puffs every 6 (six) hours as needed for wheezing or shortness of breath (Patient not taking: Reported on 12/26/2022), Disp: 8.5 g, Rfl: 0    benzonatate (TESSALON PERLES) 100 mg capsule, Take 1 capsule (100 mg total) by mouth 3 (three) times a day as needed for cough (Patient not taking: Reported on 12/26/2022), Disp: 20 capsule, Rfl: 0    citalopram (CeleXA) 10 mg tablet, Take 10 mg by mouth daily, Disp: , Rfl:     drospirenone-ethinyl estradiol (JESSICA) 3-0.02 MG per tablet, Take 1 tablet by mouth daily (Patient not taking: Reported on 11/14/2022), Disp: , Rfl:     fluticasone (FLONASE) 50 mcg/act nasal spray, 2 sprays into each nostril daily (Patient not taking: Reported on 12/26/2022), Disp: 18.2 mL, Rfl: 0    medroxyPROGESTERone (DEPO-PROVERA) 150 mg/mL injection, , Disp: , Rfl:     nitrofurantoin (MACROBID) 100 mg capsule, take 1 capsule by mouth twice a day for 7 days (Patient not taking: Reported on 5/20/2022), Disp: , Rfl:     Nortrel 1/35, 28, 1-35 MG-MCG per tablet, , Disp: , Rfl:     phenazopyridine (PYRIDIUM) 200 mg tablet, Take 1 tablet (200 mg total) by mouth 3 (three) times a day with meals, Disp: 10 tablet, Rfl: 0    Current Allergies     Allergies as of 07/31/2024    (No Known Allergies)            The following portions of the patient's history were reviewed and updated as  appropriate: allergies, current medications, past family history, past medical history, past social history, past surgical history and problem list.     Past Medical History:   Diagnosis Date    Anxiety     Depression     PCOS (polycystic ovarian syndrome)        Past Surgical History:   Procedure Laterality Date    WISDOM TOOTH EXTRACTION  2018       History reviewed. No pertinent family history.      Medications have been verified.        Objective   /65   Pulse 87   Temp 98.7 °F (37.1 °C)   Resp 18   SpO2 95%        Physical Exam     Physical Exam  Vitals and nursing note reviewed.   Constitutional:       General: She is not in acute distress.  HENT:      Head: Normocephalic.      Mouth/Throat:      Mouth: Mucous membranes are moist.      Pharynx: Oropharynx is clear.   Cardiovascular:      Rate and Rhythm: Normal rate and regular rhythm.      Pulses: Normal pulses.      Heart sounds: Normal heart sounds.   Pulmonary:      Effort: Pulmonary effort is normal.      Breath sounds: Normal breath sounds.   Skin:     General: Skin is warm.      Capillary Refill: Capillary refill takes less than 2 seconds.      Comments: Redness of shoulders and chest bilaterally consistent with sunburn, small areas of skin peeling of shoulders and chest bilaterally, no signs of infection, no visible bulla or blisters   Neurological:      General: No focal deficit present.      Mental Status: She is alert and oriented to person, place, and time.

## 2024-07-31 NOTE — PATIENT INSTRUCTIONS
Discussed continuing to apply after sun moisturizing agents to areas of burn.  Continue to monitor for any signs of infection.  With any progression or worsening of symptoms return or be seen in ER.  Work note provided.  All patient's questions answered and is agreeable with this plan.

## 2025-02-16 ENCOUNTER — OFFICE VISIT (OUTPATIENT)
Dept: URGENT CARE | Facility: CLINIC | Age: 25
End: 2025-02-16
Payer: COMMERCIAL

## 2025-02-16 VITALS
TEMPERATURE: 98.7 F | BODY MASS INDEX: 44.56 KG/M2 | DIASTOLIC BLOOD PRESSURE: 70 MMHG | HEIGHT: 61 IN | RESPIRATION RATE: 18 BRPM | SYSTOLIC BLOOD PRESSURE: 122 MMHG | HEART RATE: 70 BPM | WEIGHT: 236 LBS | OXYGEN SATURATION: 100 %

## 2025-02-16 DIAGNOSIS — R19.8 UMBILICUS DISCHARGE: Primary | ICD-10-CM

## 2025-02-16 PROCEDURE — G0382 LEV 3 HOSP TYPE B ED VISIT: HCPCS

## 2025-02-16 PROCEDURE — S9083 URGENT CARE CENTER GLOBAL: HCPCS

## 2025-02-16 RX ORDER — KETOCONAZOLE 20 MG/G
CREAM TOPICAL DAILY
Qty: 30 G | Refills: 0 | Status: SHIPPED | OUTPATIENT
Start: 2025-02-16 | End: 2025-03-02

## 2025-02-16 RX ORDER — SPIRONOLACTONE 50 MG/1
50 TABLET, FILM COATED ORAL DAILY
COMMUNITY
Start: 2024-10-15 | End: 2025-10-15

## 2025-02-16 RX ORDER — PANTOPRAZOLE SODIUM 20 MG/1
TABLET, DELAYED RELEASE ORAL
COMMUNITY
Start: 2024-07-09

## 2025-02-16 RX ORDER — DICLOFENAC SODIUM 75 MG/1
75 TABLET, DELAYED RELEASE ORAL
COMMUNITY
Start: 2024-04-22 | End: 2025-04-22

## 2025-02-16 RX ORDER — METFORMIN HYDROCHLORIDE 500 MG/1
1000 TABLET, EXTENDED RELEASE ORAL
COMMUNITY
Start: 2024-04-15 | End: 2025-04-15

## 2025-02-16 RX ORDER — CEPHALEXIN 500 MG/1
500 CAPSULE ORAL EVERY 6 HOURS SCHEDULED
Qty: 28 CAPSULE | Refills: 0 | Status: SHIPPED | OUTPATIENT
Start: 2025-02-16 | End: 2025-02-23

## 2025-02-16 NOTE — PATIENT INSTRUCTIONS
Clean bellybutton daily with soap and water.  Make sure to try and avoid any excess liquid or fluid retaining.  Apply ketoconazole antifungal ointment daily and bellybutton.  Take oral Keflex 4 times a day for 7 days.  Follow-up with PCP in 5 to 7 days.  Go to ED if symptoms worsen

## 2025-02-16 NOTE — PROGRESS NOTES
[facility]        NAME: Mariama Bonilla is a 24 y.o. female  : 2000    MRN: [id number]  DATE: 2025  TIME: 3:28 PM    Assessment and Plan   Umbilicus discharge [R19.8]  1. Umbilicus discharge  ketoconazole (NIZORAL) 2 % cream    cephalexin (KEFLEX) 500 mg capsule        Concern for cellulitis or fungal/yeast infection of bellybutton.  Warm, wet environment of patient's bellybutton that may not always have been dried properly within the last few weeks provides optimal environment for fungal infection to occur.  Starting patient on antifungal cream as well as cellulitis treatment with follow-up with PCP and general surgery for further recommendations.  Patient understands and agrees with plan.    Patient Instructions     Patient Instructions   Clean bellybutton daily with soap and water.  Make sure to try and avoid any excess liquid or fluid retaining.  Apply ketoconazole antifungal ointment daily and bellybutton.  Take oral Keflex 4 times a day for 7 days.  Follow-up with PCP in 5 to 7 days.  Go to ED if symptoms worsen      Follow up with PCP in 3-5 days.  Proceed to  ER if symptoms worsen.    Chief Complaint     Chief Complaint   Patient presents with    Wound Check     Belly button infection for 3 weeks- possibly from trapped water.          History of Present Illness       24-year-old female presenting with 2 to 3 weeks of bellybutton redness and pain.  Patient reports that 2 to 3 weeks ago she noticed that the inside of her bellybutton was slightly red and occasionally had some pain.  Patient describes the pain as a slight burning sensation that is occasionally irritated and itchy.  Patient states she has been recently noticing increase malodor coming from her bellybutton as well as some discharge or dried blood around the entrance of her bellybutton.  Patient denies any fevers, chills, body aches, lightheadedness, chest pain, or difficulty breathing.  Patient states that she is  currently following up with general surgery potentially getting her gallbladder removed and has a consult on Friday.  Concerned that since this procedure is opting to be done laparoscopically that the infection will affect when she gets her surgery.  Patient reports putting antibacterial cream and trying to make sure bellybutton is dry for the last few weeks but has noticed no change or worsening of symptoms other than the increased redness.    Wound Check        Review of Systems   Review of Systems   Constitutional:  Negative for chills, fatigue and fever.   HENT:  Negative for congestion, ear pain and sore throat.    Eyes:  Negative for discharge and redness.   Respiratory:  Negative for chest tightness and shortness of breath.    Cardiovascular:  Negative for chest pain and palpitations.   Gastrointestinal:  Negative for abdominal pain, nausea and vomiting.   Musculoskeletal:  Negative for myalgias.   Skin:  Positive for rash.   Neurological:  Negative for dizziness, light-headedness and headaches.   Psychiatric/Behavioral:  Negative for confusion.          Current Medications       Current Outpatient Medications:     cephalexin (KEFLEX) 500 mg capsule, Take 1 capsule (500 mg total) by mouth every 6 (six) hours for 7 days, Disp: 28 capsule, Rfl: 0    Cholecalciferol 50 MCG (2000 UT) CAPS, Take 1 capsule by mouth, Disp: , Rfl:     citalopram (CeleXA) 20 mg tablet, Take 20 mg by mouth daily, Disp: , Rfl:     diclofenac (VOLTAREN) 75 mg EC tablet, Take 75 mg by mouth 2 (two) times a day, Disp: , Rfl:     diclofenac (VOLTAREN) 75 mg EC tablet, Take 75 mg by mouth, Disp: , Rfl:     gabapentin (NEURONTIN) 300 mg capsule, Take 300 mg by mouth 3 (three) times a day, Disp: , Rfl:     ketoconazole (NIZORAL) 2 % cream, Apply topically daily for 14 days, Disp: 30 g, Rfl: 0    medroxyPROGESTERone (DEPO-PROVERA) 150 mg/mL injection, , Disp: , Rfl:     metFORMIN (GLUCOPHAGE) 500 mg tablet, Take 500 mg by mouth 2 (two) times a  "day with meals, Disp: , Rfl:     metFORMIN (GLUCOPHAGE) 500 mg tablet, , Disp: , Rfl:     metFORMIN (GLUCOPHAGE-XR) 500 mg 24 hr tablet, Take 1,000 mg by mouth, Disp: , Rfl:     pantoprazole (PROTONIX) 20 mg tablet, , Disp: , Rfl:     spironolactone (ALDACTONE) 50 mg tablet, Take 50 mg by mouth daily, Disp: , Rfl:     nitrofurantoin (MACROBID) 100 mg capsule, take 1 capsule by mouth twice a day for 7 days (Patient not taking: Reported on 5/20/2022), Disp: , Rfl:     Nortrel 1/35, 28, 1-35 MG-MCG per tablet, , Disp: , Rfl:     phenazopyridine (PYRIDIUM) 200 mg tablet, Take 1 tablet (200 mg total) by mouth 3 (three) times a day with meals, Disp: 10 tablet, Rfl: 0    Current Allergies     Allergies as of 02/16/2025    (No Known Allergies)            The following portions of the patient's history were reviewed and updated as appropriate: allergies, current medications, past family history, past medical history, past social history, past surgical history and problem list.     Past Medical History:   Diagnosis Date    Anxiety     Depression     PCOS (polycystic ovarian syndrome)        Past Surgical History:   Procedure Laterality Date    WISDOM TOOTH EXTRACTION  2018       History reviewed. No pertinent family history.      Medications have been verified.        Objective   /70   Pulse 70   Temp 98.7 °F (37.1 °C)   Resp 18   Ht 5' 1\" (1.549 m)   Wt 107 kg (236 lb)   SpO2 100%   BMI 44.59 kg/m²        Physical Exam     Physical Exam  Vitals and nursing note reviewed.   Constitutional:       General: She is not in acute distress.     Appearance: She is obese.   HENT:      Head: Normocephalic and atraumatic.      Right Ear: External ear normal.      Left Ear: External ear normal.      Nose: Nose normal.      Mouth/Throat:      Mouth: Mucous membranes are moist.      Pharynx: Oropharynx is clear.   Eyes:      Conjunctiva/sclera: Conjunctivae normal.      Pupils: Pupils are equal, round, and reactive to light. "   Cardiovascular:      Rate and Rhythm: Normal rate and regular rhythm.      Pulses: Normal pulses.      Heart sounds: Normal heart sounds.   Pulmonary:      Effort: Pulmonary effort is normal.   Abdominal:      General: Bowel sounds are normal.      Palpations: Abdomen is soft.      Tenderness: There is no abdominal tenderness. There is no right CVA tenderness or left CVA tenderness.   Skin:     General: Skin is warm and dry.      Capillary Refill: Capillary refill takes less than 2 seconds.      Comments: Obese patient with large protuberant abdomen.  Mildly erythematous umbilicus with malodor.  Nontender to palpation.  No visible discharge however triple antibiotic ointment currently in [facility].   Neurological:      General: No focal deficit present.      Mental Status: She is alert and oriented to person, place, and time.   Psychiatric:         Mood and Affect: Mood normal.         Behavior: Behavior normal.

## 2025-02-25 ENCOUNTER — OFFICE VISIT (OUTPATIENT)
Dept: URGENT CARE | Facility: CLINIC | Age: 25
End: 2025-02-25
Payer: COMMERCIAL

## 2025-02-25 VITALS
OXYGEN SATURATION: 99 % | DIASTOLIC BLOOD PRESSURE: 88 MMHG | RESPIRATION RATE: 18 BRPM | TEMPERATURE: 98.3 F | HEART RATE: 93 BPM | SYSTOLIC BLOOD PRESSURE: 136 MMHG

## 2025-02-25 DIAGNOSIS — R68.89 FLU-LIKE SYMPTOMS: Primary | ICD-10-CM

## 2025-02-25 PROCEDURE — S9083 URGENT CARE CENTER GLOBAL: HCPCS | Performed by: PHYSICIAN ASSISTANT

## 2025-02-25 PROCEDURE — G0382 LEV 3 HOSP TYPE B ED VISIT: HCPCS | Performed by: PHYSICIAN ASSISTANT

## 2025-02-25 NOTE — LETTER
February 25, 2025     Patient: Mariama Bonilla   YOB: 2000   Date of Visit: 2/25/2025       To Whom It May Concern:    It is my medical opinion that Mariama Bonilla may return to work on 2/28/25.    If you have any questions or concerns, please don't hesitate to call.         Sincerely,        Michelle Behler, PA-C    CC: No Recipients

## 2025-02-25 NOTE — PROGRESS NOTES
[facility]    NAME: Mariama Bonilla is a 24 y.o. female  : 2000    MRN: [id number]  DATE: 2025  TIME: 1:56 PM    Assessment and Plan   Flu-like symptoms [R68.89]  1. Flu-like symptoms            Patient Instructions     Patient Instructions   Infection appears viral.  Recommend symptomatic treatment.    Ibuprofen or tylenol as needed for pain or fever.    Over the counter cough and cold medications to help with symptoms, such as mucinex, dayquil, nyquil.    Flonase for nasal congestion.  Consider neti-pot for nasal congestion.  Use salt water gargles for sore throat and throat lozenges.    Cough drops as needed.    Wash hands frequently to prevent the spread of infection.    Vitamin D3 2000 IU daily  Vitamin C 1g every 12 hours  Multivitamin daily  Fluids and rest  Avoid being around others until you no longer have a fever for 24 hours without the use of fever reducing medication.  Fevers can last 4-5 days.  Please see your pcp or go to the ER if your fever lasts longer than that.  If not improving over the next 7-10 days, follow up with PCP.  Symptoms may persist for 10-14 days.  A post-viral cough can last for a few months.  Symptoms will likely peak between days 4-6 of illness.     If you have worsening symptoms after a week of being sick, you should see your healthcare provider again to make sure that you do not have a secondary infection.    Go to the emergency room with any worsening symptoms.              Chief Complaint     Chief Complaint   Patient presents with    Flu Symptoms     Sneezing , coughing, sinus pressure left ear blocked,headaches, body aches nausea 3 days        History of Present Illness   24 year old female here with complaint of body aches, runny nose, sneezing, nasal congestion, cough, sinus pressure and cough for the past 3 days.  No fever but has felt slightly warm at times.          Review of Systems   Review of Systems   Constitutional:  Negative for  appetite change, chills and fever.   HENT:  Negative for congestion, ear discharge, ear pain, facial swelling, postnasal drip, sinus pressure, sneezing and sore throat.    Respiratory:  Negative for cough, shortness of breath and wheezing.    Neurological:  Negative for headaches.       Current Medications     Current Outpatient Medications:     Cholecalciferol 50 MCG (2000 UT) CAPS, Take 1 capsule by mouth, Disp: , Rfl:     citalopram (CeleXA) 20 mg tablet, Take 20 mg by mouth daily, Disp: , Rfl:     diclofenac (VOLTAREN) 75 mg EC tablet, Take 75 mg by mouth 2 (two) times a day, Disp: , Rfl:     gabapentin (NEURONTIN) 300 mg capsule, Take 300 mg by mouth 3 (three) times a day, Disp: , Rfl:     ketoconazole (NIZORAL) 2 % cream, Apply topically daily for 14 days, Disp: 30 g, Rfl: 0    medroxyPROGESTERone (DEPO-PROVERA) 150 mg/mL injection, , Disp: , Rfl:     metFORMIN (GLUCOPHAGE) 500 mg tablet, Take 500 mg by mouth 2 (two) times a day with meals, Disp: , Rfl:     pantoprazole (PROTONIX) 20 mg tablet, , Disp: , Rfl:     spironolactone (ALDACTONE) 50 mg tablet, Take 50 mg by mouth daily, Disp: , Rfl:     diclofenac (VOLTAREN) 75 mg EC tablet, Take 75 mg by mouth, Disp: , Rfl:     metFORMIN (GLUCOPHAGE) 500 mg tablet, , Disp: , Rfl:     metFORMIN (GLUCOPHAGE-XR) 500 mg 24 hr tablet, Take 1,000 mg by mouth, Disp: , Rfl:     nitrofurantoin (MACROBID) 100 mg capsule, take 1 capsule by mouth twice a day for 7 days (Patient not taking: Reported on 5/20/2022), Disp: , Rfl:     Nortrel 1/35, 28, 1-35 MG-MCG per tablet, , Disp: , Rfl:     phenazopyridine (PYRIDIUM) 200 mg tablet, Take 1 tablet (200 mg total) by mouth 3 (three) times a day with meals, Disp: 10 tablet, Rfl: 0    Current Allergies     Allergies as of 02/25/2025    (No Known Allergies)          The following portions of the patient's history were reviewed and updated as appropriate: allergies, current medications, past family history, past medical history, past  social history, past surgical history and problem list.   Past Medical History:   Diagnosis Date    Anxiety     Depression     PCOS (polycystic ovarian syndrome)      Past Surgical History:   Procedure Laterality Date    WISDOM TOOTH EXTRACTION  2018     No family history on file.  Social History     Socioeconomic History    Marital status: Single     Spouse name: Not on file    Number of children: Not on file    Years of education: Not on file    Highest education level: Not on file   Occupational History    Not on file   Tobacco Use    Smoking status: Never    Smokeless tobacco: Never   Vaping Use    Vaping status: Former    Substances: Flavoring   Substance and Sexual Activity    Alcohol use: Not on file     Comment: socially    Drug use: Never    Sexual activity: Not on file   Other Topics Concern    Not on file   Social History Narrative    Not on file     Social Drivers of Health     Financial Resource Strain: Not At Risk (2/22/2025)    Received from [facility]    Financial Insecurity     In the last 12 months did you skip medications to save money?: No     In the last 12 months was there a time when you needed to see a doctor but could not because of cost?: No   Food Insecurity: No Food Insecurity (2/22/2025)    Received from [facility]    Food Insecurity     In the last 12 months did you ever eat less than you felt you should because there wasn't enough money for food?: No   Transportation Needs: No Transportation Needs (2/22/2025)    Received from [facility]    Transportation Needs     In the last 12 months have you ever had to go without healthcare because you didn't have a way to get there?: No   Physical Activity: Not on file   Stress: Stress Concern Present (4/10/2024)    Received from [facility]    [employer] [place] [employer] - Occupational Stress Questionnaire     Feeling of Stress : To some extent   Social  Connections: Socially Integrated (2/22/2025)    Received from [facility]    Social Connection     Do you often feel lonely?: No   Intimate Partner Violence: Not At Risk (4/10/2024)    [facility], [facility]    Humiliation, Afraid, Rape, and Kick questionnaire     Fear of Current or Ex-Partner: No     Emotionally Abused: No     Physically Abused: No     Sexually Abused: No   Housing Stability: Not At Risk (2/22/2025)    Received from [facility]    Housing Stability     Are you worried that in the next 2 months you may not have stable housing?: No     Medications have been verified.    Objective   /88   Pulse 93   Temp 98.3 °F (36.8 °C)   Resp 18   SpO2 99%      Physical Exam   Physical Exam  Vitals and nursing note reviewed.   Constitutional:       General: She is not in acute distress.     Appearance: She is well-developed.   HENT:      Head: Normocephalic and atraumatic.      Right Ear: Tympanic membrane normal.      Left Ear: Tympanic membrane normal.      Nose: Congestion and rhinorrhea present. No mucosal edema.      Right Sinus: No maxillary sinus tenderness or frontal sinus tenderness.      Left Sinus: No maxillary sinus tenderness or frontal sinus tenderness.      Mouth/Throat:      Pharynx: Posterior oropharyngeal erythema present. No oropharyngeal exudate.   Eyes:      Conjunctiva/sclera: Conjunctivae normal.   Cardiovascular:      Rate and Rhythm: Normal rate and regular rhythm.      Heart sounds: Normal heart sounds. No murmur heard.  Pulmonary:      Effort: Pulmonary effort is normal.      Breath sounds: Normal breath sounds. No wheezing.